# Patient Record
Sex: FEMALE | Race: WHITE | NOT HISPANIC OR LATINO | URBAN - METROPOLITAN AREA
[De-identification: names, ages, dates, MRNs, and addresses within clinical notes are randomized per-mention and may not be internally consistent; named-entity substitution may affect disease eponyms.]

---

## 2021-05-26 ENCOUNTER — TELEPHONE (OUTPATIENT)
Dept: OBGYN CLINIC | Facility: CLINIC | Age: 26
End: 2021-05-26

## 2021-05-26 NOTE — TELEPHONE ENCOUNTER
Ammon Orosco called reporting she had +HPT and - HPT over weekend  She is unsure of LMP, sometime the second week of April  She started with what she thought was her menses on Friday  Significant cramping on Saturday-passed large clot size of lemon  Continued with red/dark red bleeding x 4 days-changing super pad every 2 hours  Today bleeding varies between spotting and light bleeding  Advised patient will speak with oncall provider to obtain appt time for today for evaluation  She does not know her blood type  Advised may need bloodwork order to check hcg level and blood type  Patient verbalized understanding and agreement and will await c/b to schedule appt for today  Per Dayanna Rogers, demographic information needs updated in chart  Returned call to Ammon Orosco to confirm address  Ammon Orosco states her grandmother has already made her appointment at another ob/gyn  She does not require anything further from [facility] at this time